# Patient Record
Sex: MALE | Race: WHITE | NOT HISPANIC OR LATINO | URBAN - METROPOLITAN AREA
[De-identification: names, ages, dates, MRNs, and addresses within clinical notes are randomized per-mention and may not be internally consistent; named-entity substitution may affect disease eponyms.]

---
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
## 2025-01-07 ENCOUNTER — DOCUMENTATION (OUTPATIENT)
Dept: PEDIATRIC HEMATOLOGY/ONCOLOGY | Facility: HOSPITAL | Age: 20
End: 2025-01-07
Payer: COMMERCIAL

## 2025-01-07 NOTE — RESEARCH NOTES
Late entry     An IRB-approved patient orlando for [facility] Data Set (03-) was mailed to Maurice George on 12/20/2024. The purpose of this orlando, dated 10/29/2024, was to inform the study participant that the PI has changed from Dr. Jerome Garcia to Mr. Yariel Diez CNP. This orlando includes updated contact information in the event the patient would like to withdraw permission for the research team to use their protected health information.

## 2025-02-12 ENCOUNTER — HOME INFUSION (OUTPATIENT)
Dept: INFUSION THERAPY | Age: 20
End: 2025-02-12
Payer: COMMERCIAL

## 2025-02-12 ENCOUNTER — DOCUMENTATION (OUTPATIENT)
Dept: PHARMACY | Facility: CLINIC | Age: 20
End: 2025-02-12

## 2025-02-12 DIAGNOSIS — D67 HEMOPHILIA B (MULTI): Primary | ICD-10-CM

## 2025-02-12 RX ORDER — AMINOCAPROIC ACID 500 MG/1
2 TABLET ORAL EVERY 6 HOURS
Qty: 112 TABLET | Refills: 3 | Status: SHIPPED
Start: 2025-02-12 | End: 2025-02-19

## 2025-02-12 NOTE — PROGRESS NOTES
[facility] rec'd orders from [facility] for Amicar tabs - 2,000 mg q6h for 7 days for mucosal bleeding. 112 tabs per fill with 3 refills  Order sent to intake, insurance coverage verified    Processed fill for straight delivery 2/12:  112x Amicar 500 mg tabs  DOS: 2/13 - 2/19    Pharmacy will await further needs from [facility]/patient

## 2025-05-08 ENCOUNTER — DOCUMENTATION (OUTPATIENT)
Dept: PEDIATRIC HEMATOLOGY/ONCOLOGY | Facility: EXTERNAL LOCATION | Age: 20
End: 2025-05-08
Payer: COMMERCIAL

## 2025-05-08 ENCOUNTER — DOCUMENTATION (OUTPATIENT)
Dept: PEDIATRIC HEMATOLOGY/ONCOLOGY | Facility: HOSPITAL | Age: 20
End: 2025-05-08
Payer: COMMERCIAL

## 2025-05-08 VITALS
HEART RATE: 68 BPM | SYSTOLIC BLOOD PRESSURE: 129 MMHG | DIASTOLIC BLOOD PRESSURE: 74 MMHG | BODY MASS INDEX: 25.85 KG/M2 | WEIGHT: 180.56 LBS | HEIGHT: 70 IN | TEMPERATURE: 97.8 F

## 2025-05-08 DIAGNOSIS — D67 HEMOPHILIA B (MULTI): Primary | ICD-10-CM

## 2025-05-08 PROCEDURE — 99214 OFFICE O/P EST MOD 30 MIN: CPT

## 2025-05-08 NOTE — PROGRESS NOTES
[facility] Nursing note    Annual Visit. Patient was seen by [facility] team including physician, nurse, physical therapist, social work, and research specialist.     Patient: Maurice George  MRN: [id number]  05/08/[birth year]    Interim Bleeding/Injury History  Patient had 0 muscle bleeds, 0 joint bleeds, and 1 other bleeds in past year requiring 1 doses of factor. Patient had a nosebleed and dosed with factor once.   Patient received 0 preventative doses of factor.   Patient did not have any ER visits or Hospitalizations  Patient's brother administers factor at home.     Medication Availability  Patient currently uses Benefix  Patient currently does not have factor in the home.  Patient currently receives their factor through Insurance  Patient uses tablet Amicar as needed. Patient did not use in the past year.   Patient currently does have a supply at home.   The patient's specialty pharmacy is  Home infusion    Preventative Care  Patient currently does not have a PCP.   Patient sees PCP as needed.   Patient has not seen PCP in the past year.  Educated patient on importance of seeing PCP and dentist every year.   Patient has not seen the dentist in the past year.   Patient does not have a dental exam coming up.   Patient does not  have planned surgeries scheduled.   Patient does not have a medic alert.   Patient does not use any herbal supplements.     Emergency Wallet Card  Updated: No  Reviewed: No  Given: No    Additional Review  School/ Vocational: Tile work. Educated patient to bring factor with him to work.   Patient has not missed work due to hemophilia.   Patient does wear appropriate safety gear at work and during activities.   Alcohol use: No  Cigarette use: Yes, one pack a week. Patient is trying to quit.   Drug use: No    Patient Choice Policy  Reviewed: Yes  Signed: Yes  Copy given to patient Yes

## 2025-05-09 NOTE — PROGRESS NOTES
[facility] PT Consult    Patient: Maurice George  MRN: [id number]  05/[birth year]    Assessment   Maurice is a 19 y.o. with PMHx Hemophilia B (mod-2%) who was screened for Physical Therapy services in clinic on 5/8/2025. Pt states that he has not had any muscle or joint bleeds in the past year, has not had any injuries.     He works in tiling for work. He says that he has to be on his knees sometimes, and that when he does he wears kneepads. He does not play any sports.    Pt reports he has never had a joint or muscle bleed. He knows he would be in pain if he had a jt/mm bleed. Educated on other signs/symptoms of bleeding including swelling, warmth, difficulty with ROM, difficulty with Wbing and ambulation. Pt verbalized understanding,    Plan/Recommendations:  No further [facility] Physical Therapy needs indicated at this time. Will continue to follow during clinic visits.    Subjective   Doing good, no injuries or bleeds in the past year    Past Medical History: Medical History[1]    Past Surgical History: Surgical History[2]    Prophylaxis: No    Interim Injury History:   Spring 2023: R knee injury    Kath Rock, PT         [1]   Past Medical History:  Diagnosis Date    Mild protein-calorie malnutrition (Multi) 10/26/2018    Protein-calorie malnutrition, mild    Personal history of other diseases of the digestive system     History of constipation   [2]   Past Surgical History:  Procedure Laterality Date    OTHER SURGICAL HISTORY  07/05/2018    Abdominal Surgery

## 2025-05-10 NOTE — PROGRESS NOTES
Patient in for his annual viisit with the [facility] team. Patient reports overall health and bleeding disorder has been manageable and sees local doctor as needed, see medical notes. Patient reports mental & emotional health are good, no depressive episodes no attempts or discussion of suicide, no attempts at self harm or attempting to hurt others, no manic or depressive mood swings, no rage or uncontrolled anger or outbursts, doesn't isolate and reports managing usual and customary stressors well. Patient reports eating well, healthy and balanced 2x per day, sleeping well through the night. Patient reports occasionally drinking beer, smokes 1 pack of cigs per week, no drugs or OTC supplements. Patient reports being current with preventative dental care, reports no vision issues. Patient works full time doing tile work, no climbing with no current work related injuries or issues. Patient reports no threats or hazards in the home, basic needs are met, no need for support service referrals at this time. SW to follow up as needed.         Tonie ODEN, ROBBIN    Pediatric & Adult    Hemostasis & Thrombosis Vera

## 2025-05-27 ENCOUNTER — TELEPHONE (OUTPATIENT)
Dept: HOME HEALTH SERVICES | Facility: HOME HEALTH | Age: 20
End: 2025-05-27
Payer: COMMERCIAL

## 2025-05-27 DIAGNOSIS — D67 HEMOPHILIA B (MULTI): Primary | ICD-10-CM

## 2025-05-27 NOTE — TELEPHONE ENCOUNTER
[employer] ins Check- 5/27/25-     This patient has active coverage with [facility] Medicaid for May    Med Reviewed- Benefix     The patient will have 100% coverage for the home care infusion services While this plan is active.      ** Prior authorization has been received**     Thanks,    Leticia Palma, [facility]  Infusion  II  PH: [phone]

## 2025-05-28 ENCOUNTER — HOME INFUSION (OUTPATIENT)
Dept: INFUSION THERAPY | Age: 20
End: 2025-05-28
Payer: COMMERCIAL

## 2025-05-28 NOTE — PROGRESS NOTES
RX received orders from [facility] for BeneFIX 8000iu q24h PRN for bleeding episodes. 4 fills per dispense, 3 additional refills.  Orders sent to intake, insurance coverage verified.    Processed fill for 5/29 dispense and delivery:  8x BeneFIX 3100iu  4x BeneFIX 2150iu  DOS 5/30-6/2    Pharmacy to await further requests for refills from [facility]/patient

## 2025-08-21 ASSESSMENT — ENCOUNTER SYMPTOMS
MUSCULOSKELETAL NEGATIVE: 1
BRUISES/BLEEDS EASILY: 1
GASTROINTESTINAL NEGATIVE: 1
PSYCHIATRIC NEGATIVE: 1
HEMATURIA: 0
ALLERGIC/IMMUNOLOGIC NEGATIVE: 1
RESPIRATORY NEGATIVE: 1
NEUROLOGICAL NEGATIVE: 1
CONSTITUTIONAL NEGATIVE: 1
EYES NEGATIVE: 1
ENDOCRINE NEGATIVE: 1
CARDIOVASCULAR NEGATIVE: 1
JOINT SWELLING: 0